# Patient Record
Sex: MALE | Race: ASIAN | NOT HISPANIC OR LATINO
[De-identification: names, ages, dates, MRNs, and addresses within clinical notes are randomized per-mention and may not be internally consistent; named-entity substitution may affect disease eponyms.]

---

## 2017-02-03 ENCOUNTER — APPOINTMENT (OUTPATIENT)
Dept: UROLOGY | Facility: CLINIC | Age: 57
End: 2017-02-03

## 2017-05-10 ENCOUNTER — APPOINTMENT (OUTPATIENT)
Dept: INTERNAL MEDICINE | Facility: CLINIC | Age: 57
End: 2017-05-10

## 2017-05-10 VITALS — HEIGHT: 66 IN | WEIGHT: 152 LBS | BODY MASS INDEX: 24.43 KG/M2

## 2017-05-10 VITALS — SYSTOLIC BLOOD PRESSURE: 118 MMHG | HEART RATE: 72 BPM | RESPIRATION RATE: 12 BRPM | DIASTOLIC BLOOD PRESSURE: 72 MMHG

## 2017-05-12 LAB
25(OH)D3 SERPL-MCNC: 89.9 NG/ML
ALBUMIN SERPL ELPH-MCNC: 4.4 G/DL
ALP BLD-CCNC: 76 U/L
ALT SERPL-CCNC: 22 U/L
ANION GAP SERPL CALC-SCNC: 17 MMOL/L
AST SERPL-CCNC: 22 U/L
BILIRUB SERPL-MCNC: 0.4 MG/DL
BUN SERPL-MCNC: 18 MG/DL
CALCIUM SERPL-MCNC: 9.5 MG/DL
CHLORIDE SERPL-SCNC: 99 MMOL/L
CHOLEST SERPL-MCNC: 263 MG/DL
CHOLEST/HDLC SERPL: 7 RATIO
CO2 SERPL-SCNC: 21 MMOL/L
CREAT SERPL-MCNC: 0.91 MG/DL
GLUCOSE SERPL-MCNC: 98 MG/DL
HCV AB SER QL: NONREACTIVE
HCV S/CO RATIO: 0.2 S/CO
HDLC SERPL-MCNC: 36 MG/DL
LDLC SERPL CALC-MCNC: 186 MG/DL
POTASSIUM SERPL-SCNC: 4.3 MMOL/L
PROT SERPL-MCNC: 7.3 G/DL
SODIUM SERPL-SCNC: 137 MMOL/L
TRIGL SERPL-MCNC: 207 MG/DL

## 2017-10-11 ENCOUNTER — APPOINTMENT (OUTPATIENT)
Dept: INTERNAL MEDICINE | Facility: CLINIC | Age: 57
End: 2017-10-11
Payer: COMMERCIAL

## 2017-10-11 ENCOUNTER — NON-APPOINTMENT (OUTPATIENT)
Age: 57
End: 2017-10-11

## 2017-10-11 VITALS — HEIGHT: 66 IN | WEIGHT: 150 LBS | BODY MASS INDEX: 24.11 KG/M2

## 2017-10-11 VITALS — RESPIRATION RATE: 16 BRPM | SYSTOLIC BLOOD PRESSURE: 120 MMHG | HEART RATE: 68 BPM | DIASTOLIC BLOOD PRESSURE: 70 MMHG

## 2017-10-11 PROCEDURE — 93000 ELECTROCARDIOGRAM COMPLETE: CPT

## 2017-10-11 PROCEDURE — 99396 PREV VISIT EST AGE 40-64: CPT | Mod: 25

## 2017-10-11 PROCEDURE — 36415 COLL VENOUS BLD VENIPUNCTURE: CPT

## 2017-10-16 LAB
25(OH)D3 SERPL-MCNC: 24.3 NG/ML
ALBUMIN SERPL ELPH-MCNC: 4.3 G/DL
ALP BLD-CCNC: 79 U/L
ALT SERPL-CCNC: 21 U/L
ANION GAP SERPL CALC-SCNC: 11 MMOL/L
AST SERPL-CCNC: 24 U/L
BASOPHILS # BLD AUTO: 0.01 K/UL
BASOPHILS NFR BLD AUTO: 0.1 %
BILIRUB SERPL-MCNC: 0.4 MG/DL
BUN SERPL-MCNC: 15 MG/DL
CALCIUM SERPL-MCNC: 9.6 MG/DL
CHLORIDE SERPL-SCNC: 99 MMOL/L
CHOLEST SERPL-MCNC: 269 MG/DL
CHOLEST/HDLC SERPL: 6.3 RATIO
CO2 SERPL-SCNC: 28 MMOL/L
CREAT SERPL-MCNC: 0.95 MG/DL
EOSINOPHIL # BLD AUTO: 0.17 K/UL
EOSINOPHIL NFR BLD AUTO: 2.4 %
GLUCOSE SERPL-MCNC: 91 MG/DL
HBA1C MFR BLD HPLC: 5.5 %
HCT VFR BLD CALC: 44 %
HDLC SERPL-MCNC: 43 MG/DL
HGB BLD-MCNC: 14.1 G/DL
IMM GRANULOCYTES NFR BLD AUTO: 0.3 %
LDLC SERPL CALC-MCNC: 181 MG/DL
LYMPHOCYTES # BLD AUTO: 2.97 K/UL
LYMPHOCYTES NFR BLD AUTO: 42.5 %
MAN DIFF?: NORMAL
MCHC RBC-ENTMCNC: 28.5 PG
MCHC RBC-ENTMCNC: 32 GM/DL
MCV RBC AUTO: 88.9 FL
MONOCYTES # BLD AUTO: 0.37 K/UL
MONOCYTES NFR BLD AUTO: 5.3 %
NEUTROPHILS # BLD AUTO: 3.45 K/UL
NEUTROPHILS NFR BLD AUTO: 49.4 %
PLATELET # BLD AUTO: 250 K/UL
POTASSIUM SERPL-SCNC: 4.5 MMOL/L
PROT SERPL-MCNC: 7 G/DL
PSA SERPL-MCNC: 0.61 NG/ML
RBC # BLD: 4.95 M/UL
RBC # FLD: 13.5 %
SODIUM SERPL-SCNC: 138 MMOL/L
TRIGL SERPL-MCNC: 223 MG/DL
TSH SERPL-ACNC: 2.19 UIU/ML
WBC # FLD AUTO: 6.99 K/UL

## 2018-06-08 ENCOUNTER — APPOINTMENT (OUTPATIENT)
Dept: INTERNAL MEDICINE | Facility: CLINIC | Age: 58
End: 2018-06-08
Payer: COMMERCIAL

## 2018-06-08 VITALS — SYSTOLIC BLOOD PRESSURE: 120 MMHG | DIASTOLIC BLOOD PRESSURE: 70 MMHG | HEART RATE: 60 BPM | RESPIRATION RATE: 14 BRPM

## 2018-06-08 VITALS — BODY MASS INDEX: 24.11 KG/M2 | HEIGHT: 66 IN | WEIGHT: 150 LBS

## 2018-06-08 PROCEDURE — 99214 OFFICE O/P EST MOD 30 MIN: CPT | Mod: 25

## 2018-06-08 PROCEDURE — 36415 COLL VENOUS BLD VENIPUNCTURE: CPT

## 2018-06-08 NOTE — PHYSICAL EXAM
[No Acute Distress] : no acute distress [Well Nourished] : well nourished [Well Developed] : well developed [Well-Appearing] : well-appearing [Normal Sclera/Conjunctiva] : normal sclera/conjunctiva [PERRL] : pupils equal round and reactive to light [EOMI] : extraocular movements intact [Normal Outer Ear/Nose] : the outer ears and nose were normal in appearance [Normal Oropharynx] : the oropharynx was normal [Normal TMs] : both tympanic membranes were normal [Normal Nasal Mucosa] : the nasal mucosa was normal [No JVD] : no jugular venous distention [Supple] : supple [No Lymphadenopathy] : no lymphadenopathy [Thyroid Normal, No Nodules] : the thyroid was normal and there were no nodules present [No Respiratory Distress] : no respiratory distress  [Clear to Auscultation] : lungs were clear to auscultation bilaterally [No Accessory Muscle Use] : no accessory muscle use [Normal Rate] : normal rate  [Regular Rhythm] : with a regular rhythm [Normal S1, S2] : normal S1 and S2 [No Murmur] : no murmur heard [No Carotid Bruits] : no carotid bruits [No Abdominal Bruit] : a ~M bruit was not heard ~T in the abdomen [No Varicosities] : no varicosities [Pedal Pulses Present] : the pedal pulses are present [No Edema] : there was no peripheral edema [No Extremity Clubbing/Cyanosis] : no extremity clubbing/cyanosis [No Palpable Aorta] : no palpable aorta [Soft] : abdomen soft [Non Tender] : non-tender [Non-distended] : non-distended [No Masses] : no abdominal mass palpated [No HSM] : no HSM [Normal Bowel Sounds] : normal bowel sounds [No Hernias] : no hernias [Penis Abnormality] : normal uncircumcised penis [Testes Tenderness] : no tenderness of the testes [Testes Mass (___cm)] : there were no testicular masses [Normal Posterior Cervical Nodes] : no posterior cervical lymphadenopathy [Normal Anterior Cervical Nodes] : no anterior cervical lymphadenopathy [Normal Inguinal Nodes] : no inguinal lymphadenopathy [No CVA Tenderness] : no CVA  tenderness [No Spinal Tenderness] : no spinal tenderness [No Joint Swelling] : no joint swelling [Grossly Normal Strength/Tone] : grossly normal strength/tone [No Rash] : no rash [Normal Gait] : normal gait [Coordination Grossly Intact] : coordination grossly intact [No Focal Deficits] : no focal deficits [Speech Grossly Normal] : speech grossly normal [Memory Grossly Normal] : memory grossly normal [Normal Affect] : the affect was normal [Alert and Oriented x3] : oriented to person, place, and time [Normal Mood] : the mood was normal [Normal Insight/Judgement] : insight and judgment were intact

## 2018-06-08 NOTE — HISTORY OF PRESENT ILLNESS
[FreeTextEntry1] : hld, vit d, foot pain, post-void dribbling, chr gastritis.  [de-identified] : Pt is a 57 y/o male with a hx of hld, low vit d, foot/knee pain, post-void dribbling, chr h pylori gastritis dx'd on EGD.\par here for f/u.\par \par Has been w/o GI c/o. occ takes rx for acid. has been avoiding triggers.\par has not been taking lipid med.  Taking asa qod\par Has been taking vit D since last time. \par Has been exercising 4-5 days a week. \par diet needs improvement - trying to reduce carbs\par no noted wt change.\par s/p seeing urology with post-voiding dribbling- no rx needed. stable - has been okay.\par with occ foot pain relieved with tramadol. continues to follow with pod. occ pain at the knee.\par No noted CV sx.\par no GI sx.\par No noted neuro sx\par No rash\par No other musculoskeletal sx.\par Mood has been variable. mild. has been better with changing his line of thinking. situational, has been only occasional. \par \par colon cancer - had - will be following every 3 years \par prost - has started\par \par ophtho - has not seen for the past 2 years. \par \par vax. declines flu vaccine

## 2018-06-11 LAB
25(OH)D3 SERPL-MCNC: 43.5 NG/ML
ALBUMIN SERPL ELPH-MCNC: 4.4 G/DL
ALP BLD-CCNC: 87 U/L
ALT SERPL-CCNC: 19 U/L
ANION GAP SERPL CALC-SCNC: 13 MMOL/L
AST SERPL-CCNC: 28 U/L
BILIRUB SERPL-MCNC: 0.4 MG/DL
BUN SERPL-MCNC: 13 MG/DL
CALCIUM SERPL-MCNC: 9.5 MG/DL
CHLORIDE SERPL-SCNC: 101 MMOL/L
CHOLEST SERPL-MCNC: 231 MG/DL
CHOLEST/HDLC SERPL: 6.4 RATIO
CO2 SERPL-SCNC: 25 MMOL/L
CREAT SERPL-MCNC: 0.93 MG/DL
GLUCOSE SERPL-MCNC: 88 MG/DL
HDLC SERPL-MCNC: 36 MG/DL
LDLC SERPL CALC-MCNC: 131 MG/DL
POTASSIUM SERPL-SCNC: 4.5 MMOL/L
PROT SERPL-MCNC: 7 G/DL
SODIUM SERPL-SCNC: 139 MMOL/L
TRIGL SERPL-MCNC: 319 MG/DL

## 2018-10-16 ENCOUNTER — APPOINTMENT (OUTPATIENT)
Dept: INTERNAL MEDICINE | Facility: CLINIC | Age: 58
End: 2018-10-16

## 2019-03-25 ENCOUNTER — APPOINTMENT (OUTPATIENT)
Dept: INTERNAL MEDICINE | Facility: CLINIC | Age: 59
End: 2019-03-25
Payer: COMMERCIAL

## 2019-03-25 VITALS
DIASTOLIC BLOOD PRESSURE: 82 MMHG | WEIGHT: 146 LBS | HEIGHT: 66 IN | BODY MASS INDEX: 23.46 KG/M2 | SYSTOLIC BLOOD PRESSURE: 120 MMHG

## 2019-03-25 VITALS — RESPIRATION RATE: 12 BRPM | HEART RATE: 76 BPM

## 2019-03-25 PROCEDURE — 36415 COLL VENOUS BLD VENIPUNCTURE: CPT

## 2019-03-25 PROCEDURE — 99214 OFFICE O/P EST MOD 30 MIN: CPT | Mod: 25

## 2019-03-25 NOTE — COUNSELING
[Weight management counseling provided] : Weight management [Healthy eating counseling provided] : healthy eating [Activity counseling provided] : activity [Low Fat Diet] : Low fat diet [Low Salt Diet] : Low salt diet [Decrease Portions] : Decrease food portions [Walking] : Walking [Patient Non-adherent to care plan] : Patient non-adherent to care plan [Good understanding] : Patient has a good understanding of lifestyle changes and the steps needed to achieve self management goals

## 2019-03-25 NOTE — HISTORY OF PRESENT ILLNESS
[FreeTextEntry1] : hld, vit d, foot pain, post-void dribbling, chr gastritis. \par Viral infection [de-identified] : Pt is a 57 y/o male with a hx of hld, low vit d, foot/knee pain, post-void dribbling, chr h pylori gastritis dx'd on EGD.\par here for f/u.\par Reports viral infection for ~ 1 week.  Has been improving.  With fevers, nausea, body aches, + mild cough.  has been improving.  Was in bed for ` 5 days.  Whole family has been sick.  \par has not been taking lipid med for the past week - otherwise, had been taking.  Taking asa qod\par Has been taking vit D since last time. \par Has been exercising 4-5 days a week. \par diet needs improvement - trying to reduce carbs\par Has been w/o GI c/o. occ takes rx for acid. has been avoiding triggers.\par s/p seeing urology with post-voiding dribbling- no rx needed. stable - has been okay.\par with occ foot pain relieved with tramadol. continues to follow with pod. occ pain at the knee.\par No noted CV sx.\par no GI sx.\par No noted neuro sx\par No rash\par No other musculoskeletal sx.\par Mood has been variable. mild. has been better with changing his line of thinking. situational, has been only occasional. Recently sold the business.\par \par colon cancer - had - will be following every 3 years \par prost - has started\par \par ophtho - reports last year\par \par vax. declines flu vaccine

## 2019-03-25 NOTE — REVIEW OF SYSTEMS
[Fever] : fever [Chills] : chills [Fatigue] : fatigue [Night Sweats] : night sweats [Recent Change In Weight] : ~T recent weight change [Nausea] : nausea [Negative] : Heme/Lymph [Hot Flashes] : no hot flashes [Abdominal Pain] : no abdominal pain [Constipation] : no constipation [Diarrhea] : no diarrhea [Vomiting] : no vomiting [Heartburn] : no heartburn [Melena] : no melena [Back Pain] : no back pain

## 2019-03-26 LAB
25(OH)D3 SERPL-MCNC: 21.8 NG/ML
ALBUMIN SERPL ELPH-MCNC: 4.5 G/DL
ALP BLD-CCNC: 78 U/L
ALT SERPL-CCNC: 28 U/L
ANION GAP SERPL CALC-SCNC: 15 MMOL/L
AST SERPL-CCNC: 29 U/L
BASOPHILS # BLD AUTO: 0.01 K/UL
BASOPHILS NFR BLD AUTO: 0.2 %
BILIRUB SERPL-MCNC: 0.5 MG/DL
BUN SERPL-MCNC: 12 MG/DL
CALCIUM SERPL-MCNC: 9.5 MG/DL
CHLORIDE SERPL-SCNC: 99 MMOL/L
CHOLEST SERPL-MCNC: 194 MG/DL
CHOLEST/HDLC SERPL: 7.2 RATIO
CO2 SERPL-SCNC: 23 MMOL/L
CREAT SERPL-MCNC: 0.86 MG/DL
EOSINOPHIL # BLD AUTO: 0.21 K/UL
EOSINOPHIL NFR BLD AUTO: 4 %
GLUCOSE SERPL-MCNC: 81 MG/DL
HCT VFR BLD CALC: 47.2 %
HDLC SERPL-MCNC: 27 MG/DL
HGB BLD-MCNC: 14.4 G/DL
IMM GRANULOCYTES NFR BLD AUTO: 0.2 %
LDLC SERPL CALC-MCNC: 103 MG/DL
LYMPHOCYTES # BLD AUTO: 2.52 K/UL
LYMPHOCYTES NFR BLD AUTO: 48 %
MAN DIFF?: NORMAL
MCHC RBC-ENTMCNC: 27.1 PG
MCHC RBC-ENTMCNC: 30.5 GM/DL
MCV RBC AUTO: 88.9 FL
MONOCYTES # BLD AUTO: 0.35 K/UL
MONOCYTES NFR BLD AUTO: 6.7 %
NEUTROPHILS # BLD AUTO: 2.15 K/UL
NEUTROPHILS NFR BLD AUTO: 40.9 %
PLATELET # BLD AUTO: 211 K/UL
POTASSIUM SERPL-SCNC: 4.6 MMOL/L
PROT SERPL-MCNC: 7.2 G/DL
RBC # BLD: 5.31 M/UL
RBC # FLD: 12.7 %
SODIUM SERPL-SCNC: 137 MMOL/L
TRIGL SERPL-MCNC: 318 MG/DL
WBC # FLD AUTO: 5.25 K/UL

## 2019-07-26 ENCOUNTER — APPOINTMENT (OUTPATIENT)
Dept: INTERNAL MEDICINE | Facility: CLINIC | Age: 59
End: 2019-07-26

## 2019-09-19 ENCOUNTER — NON-APPOINTMENT (OUTPATIENT)
Age: 59
End: 2019-09-19

## 2019-09-19 ENCOUNTER — APPOINTMENT (OUTPATIENT)
Dept: INTERNAL MEDICINE | Facility: CLINIC | Age: 59
End: 2019-09-19
Payer: MEDICAID

## 2019-09-19 VITALS
RESPIRATION RATE: 14 BRPM | DIASTOLIC BLOOD PRESSURE: 76 MMHG | BODY MASS INDEX: 24.27 KG/M2 | HEART RATE: 78 BPM | HEIGHT: 66 IN | WEIGHT: 151 LBS | SYSTOLIC BLOOD PRESSURE: 118 MMHG

## 2019-09-19 DIAGNOSIS — Z86.19 PERSONAL HISTORY OF OTHER INFECTIOUS AND PARASITIC DISEASES: ICD-10-CM

## 2019-09-19 PROCEDURE — 93000 ELECTROCARDIOGRAM COMPLETE: CPT

## 2019-09-19 PROCEDURE — 36415 COLL VENOUS BLD VENIPUNCTURE: CPT

## 2019-09-19 PROCEDURE — 99396 PREV VISIT EST AGE 40-64: CPT | Mod: 25

## 2019-09-19 RX ORDER — ROSUVASTATIN CALCIUM 5 MG/1
5 TABLET, FILM COATED ORAL DAILY
Qty: 30 | Refills: 2 | Status: DISCONTINUED | COMMUNITY
Start: 2017-05-12 | End: 2019-09-19

## 2019-09-19 NOTE — HISTORY OF PRESENT ILLNESS
[FreeTextEntry1] : annual PE\par hld, vit d, foot pain, post-void dribbling, chr gastritis [de-identified] : Pt is a 60 y/o male with a hx of hld, low vit d, foot/knee pain, post-void dribbling, chr h pylori gastritis dx'd on EGD.\par here for f/u and annual PE.\par has not been taking the statin.  Has muscle sx even when taking qod. Taking asa qod\par no vit d for the past mo. \par Has been exercising 4-5 days a week. \par diet needs improvement - trying to reduce carbs\par Has been w/o GI c/o. occ takes rx for acid. has been avoiding triggers.\par s/p seeing urology with post-voiding dribbling- no rx needed. stable - has been okay w/o sx.\par with occ foot pain relieved with tramadol. continues to follow with pod. occ pain at the knee.\par No noted CV sx.\par no GI sx.\par No noted neuro sx\par No rash\par No other musculoskeletal sx.\par no depressed mood. \par \par colon cancer - had - will be following every 3 years  - due in Oct\par prost - has started\par \par ophtho - a few weeks ago\par dental next week\par \par vax. declines flu vaccine

## 2019-09-19 NOTE — COUNSELING
[Encouraged to increase physical activity] : Encouraged to increase physical activity [None] : None [Good understanding] : Patient has a good understanding of lifestyle changes and steps needed to achieve self management goal

## 2019-09-19 NOTE — HEALTH RISK ASSESSMENT
[Yes] : Yes [No] : In the past 12 months have you used drugs other than those required for medical reasons? No [2 - 4 times a month (2 pts)] : 2-4 times a month (2 points) [1 or 2 (0 pts)] : 1 or 2 (0 points) [Never (0 pts)] : Never (0 points) [No falls in past year] : Patient reported no falls in the past year [0] : 2) Feeling down, depressed, or hopeless: Not at all (0) [None] : None [With Family] : lives with family [Employed] : employed [] :  [Feels Safe at Home] : Feels safe at home [Fully functional (bathing, dressing, toileting, transferring, walking, feeding)] : Fully functional (bathing, dressing, toileting, transferring, walking, feeding) [Fully functional (using the telephone, shopping, preparing meals, housekeeping, doing laundry, using] : Fully functional and needs no help or supervision to perform IADLs (using the telephone, shopping, preparing meals, housekeeping, doing laundry, using transportation, managing medications and managing finances) [Smoke Detector] : smoke detector [Carbon Monoxide Detector] : carbon monoxide detector [Seat Belt] :  uses seat belt [Sunscreen] : uses sunscreen [] : No [[id number]] : 0 [EyeExamDate] : 9/19 [Change in mental status noted] : No change in mental status noted [High Risk Behavior] : no high risk behavior [Reports changes in hearing] : Reports no changes in hearing [Reports changes in vision] : Reports no changes in vision [Reports changes in dental health] : Reports no changes in dental health

## 2019-09-19 NOTE — REVIEW OF SYSTEMS
[Negative] : Heme/Lymph [Fever] : no fever [Chills] : no chills [Fatigue] : no fatigue [Hot Flashes] : no hot flashes [Night Sweats] : no night sweats [Recent Change In Weight] : ~T no recent weight change [Abdominal Pain] : no abdominal pain [Nausea] : no nausea [Constipation] : no constipation [Diarrhea] : no diarrhea [Vomiting] : no vomiting [Heartburn] : no heartburn [Melena] : no melena [Back Pain] : no back pain [Skin Rash] : no skin rash

## 2019-09-19 NOTE — ASSESSMENT
[FreeTextEntry1] : Discussed with the patient health care maintenance.  \par Discussed age and condition appropriate vaccinations.  \par Discussed age appropriate cancer screening testing as indicated, including colon cancer screening/colonoscopy, prostate cancer screening/PSA testing, testicular cancer screening/self exam and skin cancer screening.  \par Discussed protection from the sun.  \par Discussed healthy diet, exercise and weight control for health.\par Discussed healthy habits including abstaining from smoking and drugs and abstention/moderation with alcohol.\par Discussed routine regular ophthalmology and dental follow up.\par Routine labs discussed with the patient and sent. \par EKG: NSR@65, wnl\par \par Declining flu vaccine\par colon - due next mo.

## 2019-09-19 NOTE — PHYSICAL EXAM
[No Acute Distress] : no acute distress [Well Nourished] : well nourished [Well Developed] : well developed [Well-Appearing] : well-appearing [Normal Sclera/Conjunctiva] : normal sclera/conjunctiva [PERRL] : pupils equal round and reactive to light [EOMI] : extraocular movements intact [Normal Outer Ear/Nose] : the outer ears and nose were normal in appearance [Normal Oropharynx] : the oropharynx was normal [Normal TMs] : both tympanic membranes were normal [Normal Nasal Mucosa] : the nasal mucosa was normal [No JVD] : no jugular venous distention [No Lymphadenopathy] : no lymphadenopathy [Supple] : supple [Thyroid Normal, No Nodules] : the thyroid was normal and there were no nodules present [No Respiratory Distress] : no respiratory distress  [No Accessory Muscle Use] : no accessory muscle use [Clear to Auscultation] : lungs were clear to auscultation bilaterally [Normal Rate] : normal rate  [Regular Rhythm] : with a regular rhythm [Normal S1, S2] : normal S1 and S2 [No Murmur] : no murmur heard [No Carotid Bruits] : no carotid bruits [No Abdominal Bruit] : a ~M bruit was not heard ~T in the abdomen [Pedal Pulses Present] : the pedal pulses are present [No Edema] : there was no peripheral edema [No Palpable Aorta] : no palpable aorta [Soft] : abdomen soft [Non Tender] : non-tender [Non-distended] : non-distended [No Masses] : no abdominal mass palpated [No HSM] : no HSM [Normal Bowel Sounds] : normal bowel sounds [No Hernias] : no hernias [Penis Abnormality] : normal uncircumcised penis [Testes Tenderness] : no tenderness of the testes [Testes Mass (___cm)] : there were no testicular masses [Normal Posterior Cervical Nodes] : no posterior cervical lymphadenopathy [Normal Anterior Cervical Nodes] : no anterior cervical lymphadenopathy [Normal Inguinal Nodes] : no inguinal lymphadenopathy [No CVA Tenderness] : no CVA  tenderness [No Spinal Tenderness] : no spinal tenderness [No Joint Swelling] : no joint swelling [Grossly Normal Strength/Tone] : grossly normal strength/tone [No Rash] : no rash [Coordination Grossly Intact] : coordination grossly intact [No Focal Deficits] : no focal deficits [Normal Gait] : normal gait [Speech Grossly Normal] : speech grossly normal [Memory Grossly Normal] : memory grossly normal [Normal Affect] : the affect was normal [Alert and Oriented x3] : oriented to person, place, and time [Normal Mood] : the mood was normal [Normal Insight/Judgement] : insight and judgment were intact

## 2019-09-20 LAB
25(OH)D3 SERPL-MCNC: 21.3 NG/ML
ALBUMIN SERPL ELPH-MCNC: 4.4 G/DL
ALP BLD-CCNC: 75 U/L
ALT SERPL-CCNC: 19 U/L
ANION GAP SERPL CALC-SCNC: 13 MMOL/L
AST SERPL-CCNC: 20 U/L
BASOPHILS # BLD AUTO: 0.03 K/UL
BASOPHILS NFR BLD AUTO: 0.5 %
BILIRUB SERPL-MCNC: 0.4 MG/DL
BUN SERPL-MCNC: 17 MG/DL
CALCIUM SERPL-MCNC: 10 MG/DL
CHLORIDE SERPL-SCNC: 99 MMOL/L
CHOLEST SERPL-MCNC: 253 MG/DL
CHOLEST/HDLC SERPL: 7.7 RATIO
CO2 SERPL-SCNC: 26 MMOL/L
CREAT SERPL-MCNC: 1 MG/DL
EOSINOPHIL # BLD AUTO: 0.58 K/UL
EOSINOPHIL NFR BLD AUTO: 9.8 %
ESTIMATED AVERAGE GLUCOSE: 105 MG/DL
GLUCOSE SERPL-MCNC: 54 MG/DL
HBA1C MFR BLD HPLC: 5.3 %
HCT VFR BLD CALC: 46.9 %
HDLC SERPL-MCNC: 33 MG/DL
HGB BLD-MCNC: 14.5 G/DL
IMM GRANULOCYTES NFR BLD AUTO: 0.2 %
LDLC SERPL CALC-MCNC: 149 MG/DL
LYMPHOCYTES # BLD AUTO: 2.27 K/UL
LYMPHOCYTES NFR BLD AUTO: 38.5 %
MAN DIFF?: NORMAL
MCHC RBC-ENTMCNC: 27.3 PG
MCHC RBC-ENTMCNC: 30.9 GM/DL
MCV RBC AUTO: 88.3 FL
MONOCYTES # BLD AUTO: 0.4 K/UL
MONOCYTES NFR BLD AUTO: 6.8 %
NEUTROPHILS # BLD AUTO: 2.61 K/UL
NEUTROPHILS NFR BLD AUTO: 44.2 %
PLATELET # BLD AUTO: 259 K/UL
POTASSIUM SERPL-SCNC: 4.6 MMOL/L
PROT SERPL-MCNC: 7.2 G/DL
PSA SERPL-MCNC: 0.71 NG/ML
RBC # BLD: 5.31 M/UL
RBC # FLD: 12.7 %
SODIUM SERPL-SCNC: 138 MMOL/L
TRIGL SERPL-MCNC: 356 MG/DL
TSH SERPL-ACNC: 2.32 UIU/ML
WBC # FLD AUTO: 5.9 K/UL

## 2019-09-20 RX ORDER — EZETIMIBE 10 MG/1
10 TABLET ORAL
Qty: 30 | Refills: 3 | Status: ACTIVE | OUTPATIENT
Start: 2019-09-20

## 2019-12-20 ENCOUNTER — APPOINTMENT (OUTPATIENT)
Dept: INTERNAL MEDICINE | Facility: CLINIC | Age: 59
End: 2019-12-20
Payer: MEDICAID

## 2019-12-20 VITALS
RESPIRATION RATE: 14 BRPM | DIASTOLIC BLOOD PRESSURE: 82 MMHG | HEIGHT: 66 IN | BODY MASS INDEX: 24.11 KG/M2 | HEART RATE: 60 BPM | WEIGHT: 150 LBS | OXYGEN SATURATION: 98 % | SYSTOLIC BLOOD PRESSURE: 116 MMHG

## 2019-12-20 PROCEDURE — 99214 OFFICE O/P EST MOD 30 MIN: CPT | Mod: 25

## 2019-12-20 PROCEDURE — 36415 COLL VENOUS BLD VENIPUNCTURE: CPT

## 2019-12-20 NOTE — REVIEW OF SYSTEMS
[Negative] : Heme/Lymph [Fever] : no fever [Fatigue] : no fatigue [Chills] : no chills [Hot Flashes] : no hot flashes [Night Sweats] : no night sweats [Recent Change In Weight] : ~T no recent weight change [Abdominal Pain] : no abdominal pain [Nausea] : no nausea [Constipation] : no constipation [Diarrhea] : no diarrhea [Vomiting] : no vomiting [Heartburn] : no heartburn [Melena] : no melena [Back Pain] : no back pain

## 2019-12-20 NOTE — PHYSICAL EXAM
[No Acute Distress] : no acute distress [Well Nourished] : well nourished [Well Developed] : well developed [Well-Appearing] : well-appearing [Normal Sclera/Conjunctiva] : normal sclera/conjunctiva [PERRL] : pupils equal round and reactive to light [EOMI] : extraocular movements intact [Normal Outer Ear/Nose] : the outer ears and nose were normal in appearance [Normal Oropharynx] : the oropharynx was normal [Normal TMs] : both tympanic membranes were normal [Normal Nasal Mucosa] : the nasal mucosa was normal [No JVD] : no jugular venous distention [No Lymphadenopathy] : no lymphadenopathy [Supple] : supple [Thyroid Normal, No Nodules] : the thyroid was normal and there were no nodules present [No Respiratory Distress] : no respiratory distress  [No Accessory Muscle Use] : no accessory muscle use [Normal Rate] : normal rate  [Clear to Auscultation] : lungs were clear to auscultation bilaterally [Regular Rhythm] : with a regular rhythm [Normal S1, S2] : normal S1 and S2 [No Murmur] : no murmur heard [No Carotid Bruits] : no carotid bruits [No Abdominal Bruit] : a ~M bruit was not heard ~T in the abdomen [No Edema] : there was no peripheral edema [Pedal Pulses Present] : the pedal pulses are present [Soft] : abdomen soft [No Palpable Aorta] : no palpable aorta [Non-distended] : non-distended [Non Tender] : non-tender [No Masses] : no abdominal mass palpated [No HSM] : no HSM [Normal Bowel Sounds] : normal bowel sounds [No Hernias] : no hernias [Penis Abnormality] : normal uncircumcised penis [Testes Tenderness] : no tenderness of the testes [Testes Mass (___cm)] : there were no testicular masses [Normal Posterior Cervical Nodes] : no posterior cervical lymphadenopathy [Normal Anterior Cervical Nodes] : no anterior cervical lymphadenopathy [Normal Inguinal Nodes] : no inguinal lymphadenopathy [No CVA Tenderness] : no CVA  tenderness [No Spinal Tenderness] : no spinal tenderness [No Joint Swelling] : no joint swelling [Grossly Normal Strength/Tone] : grossly normal strength/tone [Coordination Grossly Intact] : coordination grossly intact [No Rash] : no rash [Speech Grossly Normal] : speech grossly normal [No Focal Deficits] : no focal deficits [Normal Gait] : normal gait [Memory Grossly Normal] : memory grossly normal [Normal Affect] : the affect was normal [Normal Insight/Judgement] : insight and judgment were intact [Alert and Oriented x3] : oriented to person, place, and time [Normal Mood] : the mood was normal

## 2019-12-20 NOTE — HISTORY OF PRESENT ILLNESS
[de-identified] : Pt is a 60 y/o male with a hx of hld, low vit d, foot/knee pain, post-void dribbling, chr h pylori gastritis dx'd on EGD.\par here for f/u\par has not been taking the statin.  Has muscle sx even when taking qod. Taking asa qod.  Has not really tried the zetia - plans on starting\par has been taking the vit D\par Has been exercising 4-5 days a week. \par diet needs improvement - trying to reduce carbs\par Has been w/o GI c/o. occ takes rx for acid. has been avoiding triggers.\par s/p seeing urology with post-voiding dribbling- no rx needed. stable - has been okay w/o sx.\par with occ foot pain relieved with tramadol. continues to follow with pod. occ pain at the knee.\par No noted CV sx.\par no GI sx.\par No noted neuro sx\par No rash\par No other musculoskeletal sx.\par no depressed mood. \par \par colon cancer - had - will be following every 3 years  - was due in Oct - wants to change to other b/c of the health plan.\par prost - has started\par \par ophtho - a few mo ago\par dental a few mo ago\par \par vax. declines flu vaccine  [FreeTextEntry1] : hld, vit d, foot pain, post-void dribbling, chr gastritis

## 2019-12-20 NOTE — HEALTH RISK ASSESSMENT
[Monthly or less (1 pt)] : Monthly or less (1 point) [Yes] : Yes [Never (0 pts)] : Never (0 points) [1 or 2 (0 pts)] : 1 or 2 (0 points) [No] : In the past 12 months have you used drugs other than those required for medical reasons? No [0] : 1) Little interest or pleasure doing things: Not at all (0) [Audit-CScore] : 1 [] : No [UTS5Beted] : 0

## 2019-12-23 LAB
25(OH)D3 SERPL-MCNC: 44.9 NG/ML
ALBUMIN SERPL ELPH-MCNC: 4.5 G/DL
ALP BLD-CCNC: 70 U/L
ALT SERPL-CCNC: 20 U/L
ANION GAP SERPL CALC-SCNC: 12 MMOL/L
APPEARANCE: CLEAR
AST SERPL-CCNC: 22 U/L
BILIRUB SERPL-MCNC: 0.3 MG/DL
BILIRUBIN URINE: NEGATIVE
BLOOD URINE: NEGATIVE
BUN SERPL-MCNC: 16 MG/DL
CALCIUM SERPL-MCNC: 9.4 MG/DL
CHLORIDE SERPL-SCNC: 100 MMOL/L
CHOLEST SERPL-MCNC: 245 MG/DL
CHOLEST/HDLC SERPL: 6.6 RATIO
CO2 SERPL-SCNC: 25 MMOL/L
COLOR: YELLOW
CREAT SERPL-MCNC: 0.95 MG/DL
GLUCOSE QUALITATIVE U: NEGATIVE
GLUCOSE SERPL-MCNC: 83 MG/DL
HDLC SERPL-MCNC: 37 MG/DL
KETONES URINE: NEGATIVE
LDLC SERPL CALC-MCNC: 160 MG/DL
LEUKOCYTE ESTERASE URINE: NEGATIVE
NITRITE URINE: NEGATIVE
PH URINE: 7.5
POTASSIUM SERPL-SCNC: 4.8 MMOL/L
PROT SERPL-MCNC: 6.9 G/DL
PROTEIN URINE: NEGATIVE
SODIUM SERPL-SCNC: 137 MMOL/L
SPECIFIC GRAVITY URINE: 1.02
TRIGL SERPL-MCNC: 240 MG/DL
UROBILINOGEN URINE: NORMAL

## 2020-01-06 ENCOUNTER — TRANSCRIPTION ENCOUNTER (OUTPATIENT)
Age: 60
End: 2020-01-06

## 2020-09-25 ENCOUNTER — APPOINTMENT (OUTPATIENT)
Dept: INTERNAL MEDICINE | Facility: CLINIC | Age: 60
End: 2020-09-25

## 2020-10-30 ENCOUNTER — NON-APPOINTMENT (OUTPATIENT)
Age: 60
End: 2020-10-30

## 2020-10-30 ENCOUNTER — APPOINTMENT (OUTPATIENT)
Dept: INTERNAL MEDICINE | Facility: CLINIC | Age: 60
End: 2020-10-30
Payer: COMMERCIAL

## 2020-10-30 VITALS
SYSTOLIC BLOOD PRESSURE: 110 MMHG | HEART RATE: 82 BPM | BODY MASS INDEX: 25.07 KG/M2 | HEIGHT: 66 IN | OXYGEN SATURATION: 98 % | DIASTOLIC BLOOD PRESSURE: 70 MMHG | RESPIRATION RATE: 15 BRPM | WEIGHT: 156 LBS

## 2020-10-30 DIAGNOSIS — Z00.00 ENCOUNTER FOR GENERAL ADULT MEDICAL EXAMINATION W/OUT ABNORMAL FINDINGS: ICD-10-CM

## 2020-10-30 PROCEDURE — 99072 ADDL SUPL MATRL&STAF TM PHE: CPT

## 2020-10-30 PROCEDURE — 93000 ELECTROCARDIOGRAM COMPLETE: CPT | Mod: 59

## 2020-10-30 PROCEDURE — 99396 PREV VISIT EST AGE 40-64: CPT | Mod: 25

## 2020-10-30 PROCEDURE — 36415 COLL VENOUS BLD VENIPUNCTURE: CPT

## 2020-10-30 RX ORDER — AMLODIPINE BESYLATE 2.5 MG/1
2.5 TABLET ORAL
Refills: 0 | Status: ACTIVE | COMMUNITY

## 2020-10-30 NOTE — HISTORY OF PRESENT ILLNESS
[FreeTextEntry1] : annual PE\par hld, vit d, foot pain, post-void dribbling, chr gastritis [de-identified] : Pt is a 59 y/o male with a hx of hld, low vit d, foot/knee pain, post-void dribbling, chr h pylori gastritis dx'd on EGD.\par here for f/u and annual PE\par Had seen cardiologist a few mo ago - started on amlodipine 2.5\par has not been taking the statin. Has muscle sx even when taking qod. Taking asa qod. Has been taking the zetia \par has been taking the vit D\par Has been exercising 4-5 days a week. \par diet better\par Has been w/o GI c/o. occ takes rx for acid. has been avoiding triggers.\par s/p seeing urology with post-voiding dribbling- no rx needed. stable - has been okay w/o sx.\par with occ foot pain relieved with tramadol. continues to follow with pod. occ pain at the knee.\par No noted CV sx.\par no GI sx.\par No noted neuro sx\par No rash\par No other musculoskeletal sx.\par no depressed mood. \par \par colon cancer - had - will be following every 3 years - was due in Oct - he will make appt.\par prost - has started\par \par ophtho - 2019\par dental a few mo ago\par \par vax. declines flu vaccine

## 2020-10-30 NOTE — REVIEW OF SYSTEMS
[Negative] : Psychiatric [Fever] : no fever [Chills] : no chills [Fatigue] : no fatigue [Hot Flashes] : no hot flashes [Night Sweats] : no night sweats [Recent Change In Weight] : ~T no recent weight change [Abdominal Pain] : no abdominal pain [Nausea] : no nausea [Constipation] : no constipation [Diarrhea] : no diarrhea [Vomiting] : no vomiting [Heartburn] : no heartburn [Melena] : no melena [Back Pain] : no back pain

## 2020-10-30 NOTE — HEALTH RISK ASSESSMENT
[Yes] : Yes [Monthly or less (1 pt)] : Monthly or less (1 point) [1 or 2 (0 pts)] : 1 or 2 (0 points) [Never (0 pts)] : Never (0 points) [No] : In the past 12 months have you used drugs other than those required for medical reasons? No [0] : 2) Feeling down, depressed, or hopeless: Not at all (0) [No falls in past year] : Patient reported no falls in the past year [None] : None [With Family] : lives with family [Employed] : employed [] :  [Sexually Active] : sexually active [Feels Safe at Home] : Feels safe at home [Fully functional (bathing, dressing, toileting, transferring, walking, feeding)] : Fully functional (bathing, dressing, toileting, transferring, walking, feeding) [Fully functional (using the telephone, shopping, preparing meals, housekeeping, doing laundry, using] : Fully functional and needs no help or supervision to perform IADLs (using the telephone, shopping, preparing meals, housekeeping, doing laundry, using transportation, managing medications and managing finances) [Smoke Detector] : smoke detector [Carbon Monoxide Detector] : carbon monoxide detector [Seat Belt] :  uses seat belt [Sunscreen] : uses sunscreen [] : No [[id number]] : 0 [EyeExamDate] : 2019 [Change in mental status noted] : No change in mental status noted [High Risk Behavior] : no high risk behavior [Reports changes in hearing] : Reports no changes in hearing [Reports changes in vision] : Reports no changes in vision [Reports changes in dental health] : Reports no changes in dental health

## 2020-11-01 LAB
25(OH)D3 SERPL-MCNC: 49.9 NG/ML
ALBUMIN SERPL ELPH-MCNC: 4.5 G/DL
ALP BLD-CCNC: 83 U/L
ALT SERPL-CCNC: 26 U/L
ANION GAP SERPL CALC-SCNC: 11 MMOL/L
APPEARANCE: CLEAR
AST SERPL-CCNC: 29 U/L
BASOPHILS # BLD AUTO: 0.04 K/UL
BASOPHILS NFR BLD AUTO: 0.7 %
BILIRUB SERPL-MCNC: 0.3 MG/DL
BILIRUBIN URINE: NEGATIVE
BLOOD URINE: NEGATIVE
BUN SERPL-MCNC: 13 MG/DL
CALCIUM SERPL-MCNC: 9.4 MG/DL
CHLORIDE SERPL-SCNC: 100 MMOL/L
CHOLEST SERPL-MCNC: 209 MG/DL
CO2 SERPL-SCNC: 25 MMOL/L
COLOR: NORMAL
CREAT SERPL-MCNC: 0.91 MG/DL
EOSINOPHIL # BLD AUTO: 0.65 K/UL
EOSINOPHIL NFR BLD AUTO: 10.6 %
ESTIMATED AVERAGE GLUCOSE: 105 MG/DL
GLUCOSE QUALITATIVE U: NEGATIVE
GLUCOSE SERPL-MCNC: 89 MG/DL
HBA1C MFR BLD HPLC: 5.3 %
HCT VFR BLD CALC: 45.4 %
HDLC SERPL-MCNC: 38 MG/DL
HGB BLD-MCNC: 14.5 G/DL
IMM GRANULOCYTES NFR BLD AUTO: 0.2 %
KETONES URINE: NEGATIVE
LDLC SERPL CALC-MCNC: 141 MG/DL
LEUKOCYTE ESTERASE URINE: NEGATIVE
LYMPHOCYTES # BLD AUTO: 2.83 K/UL
LYMPHOCYTES NFR BLD AUTO: 46.2 %
MAN DIFF?: NORMAL
MCHC RBC-ENTMCNC: 28.1 PG
MCHC RBC-ENTMCNC: 31.9 GM/DL
MCV RBC AUTO: 88 FL
MONOCYTES # BLD AUTO: 0.38 K/UL
MONOCYTES NFR BLD AUTO: 6.2 %
NEUTROPHILS # BLD AUTO: 2.22 K/UL
NEUTROPHILS NFR BLD AUTO: 36.1 %
NITRITE URINE: NEGATIVE
NONHDLC SERPL-MCNC: 171 MG/DL
PH URINE: 8
PLATELET # BLD AUTO: 251 K/UL
POTASSIUM SERPL-SCNC: 4.6 MMOL/L
PROT SERPL-MCNC: 7.1 G/DL
PROTEIN URINE: NEGATIVE
PSA SERPL-MCNC: 0.74 NG/ML
RBC # BLD: 5.16 M/UL
RBC # FLD: 12.3 %
SODIUM SERPL-SCNC: 136 MMOL/L
SPECIFIC GRAVITY URINE: 1.01
TRIGL SERPL-MCNC: 148 MG/DL
TSH SERPL-ACNC: 2.72 UIU/ML
UROBILINOGEN URINE: NORMAL
WBC # FLD AUTO: 6.13 K/UL

## 2021-04-23 ENCOUNTER — APPOINTMENT (OUTPATIENT)
Dept: INTERNAL MEDICINE | Facility: CLINIC | Age: 61
End: 2021-04-23
Payer: COMMERCIAL

## 2021-04-23 VITALS
HEIGHT: 66 IN | TEMPERATURE: 98 F | RESPIRATION RATE: 16 BRPM | BODY MASS INDEX: 25.07 KG/M2 | HEART RATE: 65 BPM | SYSTOLIC BLOOD PRESSURE: 128 MMHG | DIASTOLIC BLOOD PRESSURE: 80 MMHG | WEIGHT: 156 LBS | OXYGEN SATURATION: 99 %

## 2021-04-23 DIAGNOSIS — E78.5 HYPERLIPIDEMIA, UNSPECIFIED: ICD-10-CM

## 2021-04-23 DIAGNOSIS — N39.43 POST-VOID DRIBBLING: ICD-10-CM

## 2021-04-23 DIAGNOSIS — M79.673 PAIN IN UNSPECIFIED FOOT: ICD-10-CM

## 2021-04-23 DIAGNOSIS — Z78.9 OTHER SPECIFIED HEALTH STATUS: ICD-10-CM

## 2021-04-23 DIAGNOSIS — B96.81 GASTRITIS, UNSPECIFIED, W/OUT BLEEDING: ICD-10-CM

## 2021-04-23 DIAGNOSIS — K29.70 GASTRITIS, UNSPECIFIED, W/OUT BLEEDING: ICD-10-CM

## 2021-04-23 DIAGNOSIS — I10 ESSENTIAL (PRIMARY) HYPERTENSION: ICD-10-CM

## 2021-04-23 DIAGNOSIS — E55.9 VITAMIN D DEFICIENCY, UNSPECIFIED: ICD-10-CM

## 2021-04-23 PROCEDURE — 99072 ADDL SUPL MATRL&STAF TM PHE: CPT

## 2021-04-23 PROCEDURE — 99214 OFFICE O/P EST MOD 30 MIN: CPT | Mod: 25

## 2021-04-23 NOTE — HEALTH RISK ASSESSMENT
[Yes] : Yes [Monthly or less (1 pt)] : Monthly or less (1 point) [1 or 2 (0 pts)] : 1 or 2 (0 points) [Never (0 pts)] : Never (0 points) [No] : In the past 12 months have you used drugs other than those required for medical reasons? No [0] : 2) Feeling down, depressed, or hopeless: Not at all (0) [] : No [Audit-CScore] : 1 [[id number]] : 0

## 2021-04-23 NOTE — REVIEW OF SYSTEMS
[Negative] : Heme/Lymph [Fever] : no fever [Chills] : no chills [Fatigue] : no fatigue [Hot Flashes] : no hot flashes [Night Sweats] : no night sweats [Recent Change In Weight] : ~T no recent weight change [Abdominal Pain] : no abdominal pain [Nausea] : no nausea [Constipation] : no constipation [Diarrhea] : no diarrhea [Vomiting] : no vomiting [Heartburn] : no heartburn [Melena] : no melena

## 2021-04-23 NOTE — HISTORY OF PRESENT ILLNESS
[FreeTextEntry1] : hld, htn, vit d, foot pain, post-void dribbling, chr gastritis [de-identified] : Pt is a 59 y/o male with a hx of hld, low vit d, foot/knee pain, post-void dribbling, chr h pylori gastritis dx'd on EGD.\par here for f/u\par has seen cardiology in the past.\par has not been taking the statin. Has muscle sx even when taking qod. Taking asa qod. Has been taking the zetia \par has been taking the vit D\par Has been exercising 4-5 days a week. \par diet better\par Has been w/o GI c/o. occ takes rx for acid. has been avoiding triggers.\par s/p seeing urology with post-voiding dribbling- no rx needed. stable - has been okay w/o sx.\par with occ foot pain relieved with tramadol. continues to follow with pod. occ pain at the knee.\par No noted CV sx.\par no GI sx.\par No noted neuro sx\par No rash\par No other musculoskeletal sx.\par no depressed mood. \par \par colon cancer - had - will be following every 3 years - was due in Oct - he will make appt - needs to clear with insurance\par prost - has started\par \par ophtho - 2019\par dental - regular\par \par vax. declines flu vaccine \par had covid vaccine 1/2 - for #2 today.

## 2021-04-23 NOTE — PHYSICAL EXAM
[Well Nourished] : well nourished [Well Developed] : well developed [Well-Appearing] : well-appearing [Normal Sclera/Conjunctiva] : normal sclera/conjunctiva [PERRL] : pupils equal round and reactive to light [EOMI] : extraocular movements intact [Normal Outer Ear/Nose] : the outer ears and nose were normal in appearance [Normal Oropharynx] : the oropharynx was normal [No JVD] : no jugular venous distention [No Lymphadenopathy] : no lymphadenopathy [Supple] : supple [Thyroid Normal, No Nodules] : the thyroid was normal and there were no nodules present [No Respiratory Distress] : no respiratory distress  [No Accessory Muscle Use] : no accessory muscle use [Clear to Auscultation] : lungs were clear to auscultation bilaterally [Normal Rate] : normal rate  [Regular Rhythm] : with a regular rhythm [Normal S1, S2] : normal S1 and S2 [No Murmur] : no murmur heard [No Carotid Bruits] : no carotid bruits [No Abdominal Bruit] : a ~M bruit was not heard ~T in the abdomen [Pedal Pulses Present] : the pedal pulses are present [No Edema] : there was no peripheral edema [No Palpable Aorta] : no palpable aorta [Soft] : abdomen soft [Non Tender] : non-tender [Non-distended] : non-distended [No Masses] : no abdominal mass palpated [No HSM] : no HSM [Normal Bowel Sounds] : normal bowel sounds [Normal Posterior Cervical Nodes] : no posterior cervical lymphadenopathy [Normal Anterior Cervical Nodes] : no anterior cervical lymphadenopathy [No CVA Tenderness] : no CVA  tenderness [No Spinal Tenderness] : no spinal tenderness [No Joint Swelling] : no joint swelling [Grossly Normal Strength/Tone] : grossly normal strength/tone [No Rash] : no rash [Coordination Grossly Intact] : coordination grossly intact [No Focal Deficits] : no focal deficits [Normal Gait] : normal gait [Speech Grossly Normal] : speech grossly normal [Memory Grossly Normal] : memory grossly normal [Normal Affect] : the affect was normal [Alert and Oriented x3] : oriented to person, place, and time [Normal Mood] : the mood was normal [Normal Insight/Judgement] : insight and judgment were intact

## 2021-04-26 LAB — 25(OH)D3 SERPL-MCNC: 48.6 NG/ML

## 2021-12-01 ENCOUNTER — APPOINTMENT (OUTPATIENT)
Dept: INTERNAL MEDICINE | Facility: CLINIC | Age: 61
End: 2021-12-01

## 2021-12-23 ENCOUNTER — TRANSCRIPTION ENCOUNTER (OUTPATIENT)
Age: 61
End: 2021-12-23

## 2022-03-03 ENCOUNTER — APPOINTMENT (OUTPATIENT)
Dept: INTERNAL MEDICINE | Facility: CLINIC | Age: 62
End: 2022-03-03

## 2022-05-10 ENCOUNTER — NON-APPOINTMENT (OUTPATIENT)
Age: 62
End: 2022-05-10

## 2022-10-27 ENCOUNTER — APPOINTMENT (OUTPATIENT)
Dept: INTERNAL MEDICINE | Facility: CLINIC | Age: 62
End: 2022-10-27

## 2024-11-25 ENCOUNTER — APPOINTMENT (OUTPATIENT)
Dept: INTERNAL MEDICINE | Facility: CLINIC | Age: 64
End: 2024-11-25
Payer: COMMERCIAL

## 2024-11-25 ENCOUNTER — NON-APPOINTMENT (OUTPATIENT)
Age: 64
End: 2024-11-25

## 2024-11-25 VITALS
HEIGHT: 66 IN | WEIGHT: 154 LBS | OXYGEN SATURATION: 98 % | BODY MASS INDEX: 24.75 KG/M2 | HEART RATE: 78 BPM | DIASTOLIC BLOOD PRESSURE: 66 MMHG | SYSTOLIC BLOOD PRESSURE: 120 MMHG | TEMPERATURE: 98.6 F | RESPIRATION RATE: 16 BRPM

## 2024-11-25 DIAGNOSIS — E78.5 HYPERLIPIDEMIA, UNSPECIFIED: ICD-10-CM

## 2024-11-25 DIAGNOSIS — I10 ESSENTIAL (PRIMARY) HYPERTENSION: ICD-10-CM

## 2024-11-25 DIAGNOSIS — E55.9 VITAMIN D DEFICIENCY, UNSPECIFIED: ICD-10-CM

## 2024-11-25 DIAGNOSIS — Z00.00 ENCOUNTER FOR GENERAL ADULT MEDICAL EXAMINATION W/OUT ABNORMAL FINDINGS: ICD-10-CM

## 2024-11-25 DIAGNOSIS — G89.29 PAIN IN RIGHT KNEE: ICD-10-CM

## 2024-11-25 DIAGNOSIS — M79.673 PAIN IN UNSPECIFIED FOOT: ICD-10-CM

## 2024-11-25 DIAGNOSIS — M25.561 PAIN IN RIGHT KNEE: ICD-10-CM

## 2024-11-25 DIAGNOSIS — K29.70 GASTRITIS, UNSPECIFIED, W/OUT BLEEDING: ICD-10-CM

## 2024-11-25 DIAGNOSIS — B96.81 GASTRITIS, UNSPECIFIED, W/OUT BLEEDING: ICD-10-CM

## 2024-11-25 DIAGNOSIS — M25.562 PAIN IN RIGHT KNEE: ICD-10-CM

## 2024-11-25 PROCEDURE — 93000 ELECTROCARDIOGRAM COMPLETE: CPT

## 2024-11-25 PROCEDURE — 99387 INIT PM E/M NEW PAT 65+ YRS: CPT

## 2024-11-25 RX ORDER — TRAMADOL HYDROCHLORIDE AND ACETAMINOPHEN 37.5; 325 MG/1; MG/1
37.5-325 TABLET, FILM COATED ORAL 3 TIMES DAILY
Qty: 60 | Refills: 1 | Status: ACTIVE | COMMUNITY
Start: 2024-11-25 | End: 1900-01-01

## 2024-11-25 RX ORDER — ROSUVASTATIN CALCIUM 10 MG/1
10 TABLET, FILM COATED ORAL
Qty: 90 | Refills: 1 | Status: ACTIVE | COMMUNITY
Start: 2024-11-25

## 2024-11-26 LAB
25(OH)D3 SERPL-MCNC: 23.1 NG/ML
ALBUMIN SERPL ELPH-MCNC: 4.4 G/DL
ALP BLD-CCNC: 72 U/L
ALT SERPL-CCNC: 22 U/L
ANION GAP SERPL CALC-SCNC: 14 MMOL/L
APPEARANCE: CLEAR
AST SERPL-CCNC: 22 U/L
BACTERIA: NEGATIVE /HPF
BASOPHILS # BLD AUTO: 0.04 K/UL
BASOPHILS NFR BLD AUTO: 0.5 %
BILIRUB SERPL-MCNC: 0.4 MG/DL
BILIRUBIN URINE: NEGATIVE
BLOOD URINE: NEGATIVE
BUN SERPL-MCNC: 18 MG/DL
CALCIUM SERPL-MCNC: 8.9 MG/DL
CAST: 0 /LPF
CHLORIDE SERPL-SCNC: 98 MMOL/L
CHOLEST SERPL-MCNC: 173 MG/DL
CO2 SERPL-SCNC: 22 MMOL/L
COLOR: YELLOW
CREAT SERPL-MCNC: 0.94 MG/DL
EGFR: 90 ML/MIN/1.73M2
EOSINOPHIL # BLD AUTO: 0.63 K/UL
EOSINOPHIL NFR BLD AUTO: 7.7 %
EPITHELIAL CELLS: 0 /HPF
ESTIMATED AVERAGE GLUCOSE: 114 MG/DL
GLUCOSE QUALITATIVE U: NEGATIVE MG/DL
GLUCOSE SERPL-MCNC: 89 MG/DL
HBA1C MFR BLD HPLC: 5.6 %
HCT VFR BLD CALC: 45 %
HDLC SERPL-MCNC: 35 MG/DL
HGB BLD-MCNC: 14.1 G/DL
IMM GRANULOCYTES NFR BLD AUTO: 0.2 %
KETONES URINE: NEGATIVE MG/DL
LDLC SERPL CALC-MCNC: 89 MG/DL
LEUKOCYTE ESTERASE URINE: NEGATIVE
LYMPHOCYTES # BLD AUTO: 3.13 K/UL
LYMPHOCYTES NFR BLD AUTO: 38 %
MAN DIFF?: NORMAL
MCHC RBC-ENTMCNC: 27.7 PG
MCHC RBC-ENTMCNC: 31.3 G/DL
MCV RBC AUTO: 88.4 FL
MICROSCOPIC-UA: NORMAL
MONOCYTES # BLD AUTO: 0.65 K/UL
MONOCYTES NFR BLD AUTO: 7.9 %
NEUTROPHILS # BLD AUTO: 3.76 K/UL
NEUTROPHILS NFR BLD AUTO: 45.7 %
NITRITE URINE: NEGATIVE
NONHDLC SERPL-MCNC: 139 MG/DL
PH URINE: 7
PLATELET # BLD AUTO: 247 K/UL
POTASSIUM SERPL-SCNC: 4.8 MMOL/L
PROT SERPL-MCNC: 7.1 G/DL
PROTEIN URINE: NEGATIVE MG/DL
PSA SERPL-MCNC: 0.95 NG/ML
RBC # BLD: 5.09 M/UL
RBC # FLD: 13.1 %
RED BLOOD CELLS URINE: 2 /HPF
SODIUM SERPL-SCNC: 134 MMOL/L
SPECIFIC GRAVITY URINE: 1.02
TRIGL SERPL-MCNC: 299 MG/DL
TSH SERPL-ACNC: 2.29 UIU/ML
UROBILINOGEN URINE: 0.2 MG/DL
WBC # FLD AUTO: 8.23 K/UL
WHITE BLOOD CELLS URINE: 0 /HPF